# Patient Record
Sex: FEMALE | Race: ASIAN | NOT HISPANIC OR LATINO | URBAN - METROPOLITAN AREA
[De-identification: names, ages, dates, MRNs, and addresses within clinical notes are randomized per-mention and may not be internally consistent; named-entity substitution may affect disease eponyms.]

---

## 2021-12-01 ENCOUNTER — EMERGENCY (EMERGENCY)
Facility: HOSPITAL | Age: 46
LOS: 1 days | Discharge: LEFT BEFORE DISCHARGE | End: 2021-12-01
Attending: EMERGENCY MEDICINE | Admitting: EMERGENCY MEDICINE
Payer: COMMERCIAL

## 2021-12-01 VITALS
TEMPERATURE: 98 F | SYSTOLIC BLOOD PRESSURE: 118 MMHG | DIASTOLIC BLOOD PRESSURE: 82 MMHG | HEART RATE: 88 BPM | OXYGEN SATURATION: 98 % | WEIGHT: 132.94 LBS | RESPIRATION RATE: 16 BRPM | HEIGHT: 63 IN

## 2021-12-01 DIAGNOSIS — Z90.11 ACQUIRED ABSENCE OF RIGHT BREAST AND NIPPLE: Chronic | ICD-10-CM

## 2021-12-01 LAB — HCG UR QL: NEGATIVE — SIGNIFICANT CHANGE UP

## 2021-12-01 PROCEDURE — 99283 EMERGENCY DEPT VISIT LOW MDM: CPT

## 2021-12-01 PROCEDURE — 81025 URINE PREGNANCY TEST: CPT

## 2021-12-01 PROCEDURE — 99284 EMERGENCY DEPT VISIT MOD MDM: CPT

## 2021-12-01 NOTE — ED ADULT NURSE NOTE - OBJECTIVE STATEMENT
Presented to ED, pt stated she accidentally took her dog's heartworm med instead of her Tamoxifen about 15 mins ago. Denies abd pain, N/V/D at this time.

## 2021-12-01 NOTE — ED PROVIDER NOTE - ATTENDING CONTRIBUTION TO CARE
pt c/o accidental taking her dog's heartworm medication instead of her tamoixfen while distracted this am approx 30 min pta. pt denies any symptoms.  wd, wn female, nad, perrl, eomi, mmm, s1s2 rrr lungs cta, abd soft, nt, nd, skin warm dry no rash pt c/o accidental taking her dog's heartworm medication instead of her tamoixfen while distracted this am approx 30 min pta. pt denies any symptoms. per medication box, contains Sarolaner 6mg, Moxidectin 0.12mg, and Pyrantel 25mg.  wd, wn female, nad, perrl, eomi, mmm, s1s2 rrr lungs cta, abd soft, nt, nd, skin warm dry no rash

## 2021-12-01 NOTE — ED PROVIDER NOTE - PATIENT PORTAL LINK FT
You can access the FollowMyHealth Patient Portal offered by [facility] by registering at the following website: [email or web address]. By joining [facility]’s FollowMyHealth portal, you will also be able to view your health information using other applications (apps) compatible with our system.

## 2021-12-01 NOTE — ED PROVIDER NOTE - OBJECTIVE STATEMENT
47 y/o female with PMHx Breast Ca presents today due to ingestion of her dogs medication Simparica Trio 25 minutes PTA. pt reports she is feeling well but nervous. Medication ingredients include Sarolaner 6mg, Moxidectin 0.12mg, and Pyrantel 25mg. Pt denies cp, sob, abd pain, N/V/D, fever, dizziness, palpitations, or any other complaints.

## 2021-12-01 NOTE — ED PROVIDER NOTE - PROGRESS NOTE DETAILS
Discussed with Poison Control Amilcar, advised to observe for 4 hours. Reports watch for N/V/D. Advised no concern for cardiorespiratory distress. Advised supportive care. Advised patient poison control recommended observation for 4 hours. Pt refused to stay, reports she has to go to her appointment as she is being treated for breast ca. pt reports she will return should she develop any symptoms. AMA form signed. Advised patient of risks.

## 2021-12-01 NOTE — ED PROVIDER NOTE - NSFOLLOWUPINSTRUCTIONS_ED_ALL_ED_FT
You are leaving against medical advice (AMA).  This may result in recurrent or worsening symptoms, severe permanent disability, pain and suffering, harm, injury, and/or even death.  The risks, benefits, and alternatives to treatment as well as the attendant risks of refusing treatment at this time have been discussed.  You have demonstrated comprehension and verbalized understanding of these risks.  If you change your mind, please return to the ER immediately.  Please return to the ER immediately for persistent or recurring symptoms, worsening symptoms, or any other problems or concerns.  Please contact the ER if you have any further questions or concerns.

## 2021-12-01 NOTE — ED ADULT NURSE NOTE - CAS EDN INTEG ASSESS
Retrieved message from EM pod phone questioning bill for second charge on orthotics.    LVM for pt to return call.   - - -

## 2021-12-01 NOTE — ED PROVIDER NOTE - CLINICAL SUMMARY MEDICAL DECISION MAKING FREE TEXT BOX
today due to ingestion of her dogs medication Simparica Trio 25 minutes PTA. pt reports she is feeling well but nervous. Medication ingredients include Sarolaner 6mg, Moxidectin 0.12mg, and Pyrantel 25mg. plan includes observation.

## 2023-01-03 NOTE — ED PROVIDER NOTE - ATTENDING WITH...
Refill request for Folic Acid 1 mg Tab  Last follow-up 12/16/22; next follow-up 3/17/23  Medication T'd for review and signature  Nikolas Brenner, MARLA ATC on 1/3/2023 at 8:03 AM    folic acid (FOLVITE) 1 MG tablet 30 tablet 4 8/15/2022  No   Sig - Route: Take 1 tablet (1,000 mcg) by mouth daily - Oral       
ACP

## 2023-02-06 PROBLEM — C50.919 MALIGNANT NEOPLASM OF UNSPECIFIED SITE OF UNSPECIFIED FEMALE BREAST: Chronic | Status: ACTIVE | Noted: 2021-12-01

## 2023-02-06 PROBLEM — Z00.00 ENCOUNTER FOR PREVENTIVE HEALTH EXAMINATION: Status: ACTIVE | Noted: 2023-02-06

## 2023-02-07 ENCOUNTER — NON-APPOINTMENT (OUTPATIENT)
Age: 48
End: 2023-02-07

## 2023-02-09 ENCOUNTER — APPOINTMENT (OUTPATIENT)
Dept: GASTROENTEROLOGY | Facility: CLINIC | Age: 48
End: 2023-02-09

## 2023-03-24 ENCOUNTER — NON-APPOINTMENT (OUTPATIENT)
Age: 48
End: 2023-03-24

## 2023-05-26 ENCOUNTER — APPOINTMENT (OUTPATIENT)
Dept: GASTROENTEROLOGY | Facility: CLINIC | Age: 48
End: 2023-05-26